# Patient Record
Sex: MALE | Race: WHITE | NOT HISPANIC OR LATINO | URBAN - METROPOLITAN AREA
[De-identification: names, ages, dates, MRNs, and addresses within clinical notes are randomized per-mention and may not be internally consistent; named-entity substitution may affect disease eponyms.]

---

## 2021-06-01 ENCOUNTER — EMERGENCY (EMERGENCY)
Facility: HOSPITAL | Age: 46
LOS: 1 days | Discharge: HOME | End: 2021-06-01
Attending: EMERGENCY MEDICINE | Admitting: EMERGENCY MEDICINE
Payer: COMMERCIAL

## 2021-06-01 VITALS
TEMPERATURE: 97 F | RESPIRATION RATE: 18 BRPM | HEART RATE: 92 BPM | DIASTOLIC BLOOD PRESSURE: 76 MMHG | OXYGEN SATURATION: 96 % | SYSTOLIC BLOOD PRESSURE: 110 MMHG

## 2021-06-01 VITALS
DIASTOLIC BLOOD PRESSURE: 85 MMHG | RESPIRATION RATE: 18 BRPM | SYSTOLIC BLOOD PRESSURE: 171 MMHG | OXYGEN SATURATION: 98 % | HEART RATE: 98 BPM

## 2021-06-01 LAB
ALBUMIN SERPL ELPH-MCNC: 4.1 G/DL — SIGNIFICANT CHANGE UP (ref 3.3–5)
ALP SERPL-CCNC: 62 U/L — SIGNIFICANT CHANGE UP (ref 40–120)
ALT FLD-CCNC: 34 U/L — SIGNIFICANT CHANGE UP (ref 4–41)
ANION GAP SERPL CALC-SCNC: 13 MMOL/L — SIGNIFICANT CHANGE UP (ref 7–14)
AST SERPL-CCNC: 24 U/L — SIGNIFICANT CHANGE UP (ref 4–40)
BASOPHILS # BLD AUTO: 0.06 K/UL — SIGNIFICANT CHANGE UP (ref 0–0.2)
BASOPHILS NFR BLD AUTO: 0.4 % — SIGNIFICANT CHANGE UP (ref 0–2)
BILIRUB SERPL-MCNC: 0.4 MG/DL — SIGNIFICANT CHANGE UP (ref 0.2–1.2)
BUN SERPL-MCNC: 18 MG/DL — SIGNIFICANT CHANGE UP (ref 7–23)
CALCIUM SERPL-MCNC: 8.7 MG/DL — SIGNIFICANT CHANGE UP (ref 8.4–10.5)
CHLORIDE SERPL-SCNC: 99 MMOL/L — SIGNIFICANT CHANGE UP (ref 98–107)
CO2 SERPL-SCNC: 24 MMOL/L — SIGNIFICANT CHANGE UP (ref 22–31)
CREAT SERPL-MCNC: 1.35 MG/DL — HIGH (ref 0.5–1.3)
EOSINOPHIL # BLD AUTO: 0.11 K/UL — SIGNIFICANT CHANGE UP (ref 0–0.5)
EOSINOPHIL NFR BLD AUTO: 0.7 % — SIGNIFICANT CHANGE UP (ref 0–6)
GLUCOSE SERPL-MCNC: 114 MG/DL — HIGH (ref 70–99)
HCT VFR BLD CALC: 36.6 % — LOW (ref 39–50)
HGB BLD-MCNC: 12.5 G/DL — LOW (ref 13–17)
IANC: 12.56 K/UL — HIGH (ref 1.5–8.5)
IMM GRANULOCYTES NFR BLD AUTO: 0.6 % — SIGNIFICANT CHANGE UP (ref 0–1.5)
LYMPHOCYTES # BLD AUTO: 1.08 K/UL — SIGNIFICANT CHANGE UP (ref 1–3.3)
LYMPHOCYTES # BLD AUTO: 7.3 % — LOW (ref 13–44)
MCHC RBC-ENTMCNC: 29.4 PG — SIGNIFICANT CHANGE UP (ref 27–34)
MCHC RBC-ENTMCNC: 34.2 GM/DL — SIGNIFICANT CHANGE UP (ref 32–36)
MCV RBC AUTO: 86.1 FL — SIGNIFICANT CHANGE UP (ref 80–100)
MONOCYTES # BLD AUTO: 0.83 K/UL — SIGNIFICANT CHANGE UP (ref 0–0.9)
MONOCYTES NFR BLD AUTO: 5.6 % — SIGNIFICANT CHANGE UP (ref 2–14)
NEUTROPHILS # BLD AUTO: 12.56 K/UL — HIGH (ref 1.8–7.4)
NEUTROPHILS NFR BLD AUTO: 85.4 % — HIGH (ref 43–77)
NRBC # BLD: 0 /100 WBCS — SIGNIFICANT CHANGE UP
NRBC # FLD: 0 K/UL — SIGNIFICANT CHANGE UP
NT-PROBNP SERPL-SCNC: 37 PG/ML — SIGNIFICANT CHANGE UP
PLATELET # BLD AUTO: 276 K/UL — SIGNIFICANT CHANGE UP (ref 150–400)
POTASSIUM SERPL-MCNC: 4 MMOL/L — SIGNIFICANT CHANGE UP (ref 3.5–5.3)
POTASSIUM SERPL-SCNC: 4 MMOL/L — SIGNIFICANT CHANGE UP (ref 3.5–5.3)
PROT SERPL-MCNC: 7.3 G/DL — SIGNIFICANT CHANGE UP (ref 6–8.3)
RBC # BLD: 4.25 M/UL — SIGNIFICANT CHANGE UP (ref 4.2–5.8)
RBC # FLD: 13.1 % — SIGNIFICANT CHANGE UP (ref 10.3–14.5)
SODIUM SERPL-SCNC: 136 MMOL/L — SIGNIFICANT CHANGE UP (ref 135–145)
TROPONIN T, HIGH SENSITIVITY RESULT: 6 NG/L — SIGNIFICANT CHANGE UP
WBC # BLD: 14.73 K/UL — HIGH (ref 3.8–10.5)
WBC # FLD AUTO: 14.73 K/UL — HIGH (ref 3.8–10.5)

## 2021-06-01 PROCEDURE — 73030 X-RAY EXAM OF SHOULDER: CPT | Mod: 26,RT

## 2021-06-01 PROCEDURE — 99284 EMERGENCY DEPT VISIT MOD MDM: CPT | Mod: 25

## 2021-06-01 PROCEDURE — 93010 ELECTROCARDIOGRAM REPORT: CPT

## 2021-06-01 PROCEDURE — 71045 X-RAY EXAM CHEST 1 VIEW: CPT | Mod: 26

## 2021-06-01 RX ORDER — ACETAMINOPHEN 500 MG
650 TABLET ORAL ONCE
Refills: 0 | Status: COMPLETED | OUTPATIENT
Start: 2021-06-01 | End: 2021-06-01

## 2021-06-01 RX ORDER — LIDOCAINE 4 G/100G
1 CREAM TOPICAL
Qty: 5 | Refills: 0
Start: 2021-06-01

## 2021-06-01 RX ORDER — CYCLOBENZAPRINE HYDROCHLORIDE 10 MG/1
1 TABLET, FILM COATED ORAL
Qty: 14 | Refills: 0
Start: 2021-06-01

## 2021-06-01 RX ORDER — IBUPROFEN 200 MG
1 TABLET ORAL
Qty: 25 | Refills: 0
Start: 2021-06-01

## 2021-06-01 RX ORDER — LIDOCAINE 4 G/100G
1 CREAM TOPICAL ONCE
Refills: 0 | Status: COMPLETED | OUTPATIENT
Start: 2021-06-01 | End: 2021-06-01

## 2021-06-01 RX ORDER — KETOROLAC TROMETHAMINE 30 MG/ML
30 SYRINGE (ML) INJECTION ONCE
Refills: 0 | Status: DISCONTINUED | OUTPATIENT
Start: 2021-06-01 | End: 2021-06-01

## 2021-06-01 RX ADMIN — LIDOCAINE 1 PATCH: 4 CREAM TOPICAL at 21:10

## 2021-06-01 RX ADMIN — Medication 30 MILLIGRAM(S): at 21:10

## 2021-06-01 RX ADMIN — Medication 650 MILLIGRAM(S): at 21:09

## 2021-06-01 NOTE — ED ADULT NURSE NOTE - OBJECTIVE STATEMENT
45 year old male c/o right shoulder/scapula pain, radiates to chest, right head/scalp pain s/p trip and fall while playing basketball with his son. Hx of NSVT, HTN. Pt. denies LOC, admits to feeling dizzy after fall. Denies use of blood thinners. Limited ROM of right UE, strong radial pulses B/L. Superficial abrasions noted to right shoulder, right side of head, no active bleeding. NSR on CM, respirations are even and unlabored. 18g IV placed by EMS is patent. Labs sent as ordered. Will continue to monitor.

## 2021-06-01 NOTE — ED PROVIDER NOTE - MUSCULOSKELETAL, MLM
Right shoulder: + tenderness to deltoid region w/ abrasion and scapular region. pain with lateral extension. freely moving elbow and wrist joint. No midline spinal tenderness.  Spine appears normal, range of motion is not limited, no muscle or joint tenderness

## 2021-06-01 NOTE — ED PROVIDER NOTE - PATIENT PORTAL LINK FT
You can access the FollowMyHealth Patient Portal offered by [facility] by registering at the following website: [email or web address]. By joining [employer]’s FollowMyHealth portal, you will also be able to view your health information using other applications (apps) compatible with our system.

## 2021-06-01 NOTE — ED PROVIDER NOTE - CLINICAL SUMMARY MEDICAL DECISION MAKING FREE TEXT BOX
patient with hx of HTN, SVT presenting with right shoulder/chest/ upper back pain s/p mechanical fall. patient well appearing, vitals stable, EKG NSR nonischemic. right shoulder tenderness noted on exam. given cardiac hx will obtain routine labs, including trop, bnp. CXR, right shoulder xray, analagesic

## 2021-06-01 NOTE — ED ADULT NURSE NOTE - INTERVENTIONS DEFINITIONS
[place] to call system/Call bell, personal items and telephone within reach/Instruct patient to call for assistance/Monitor gait and stability/Monitor for mental status changes and reorient to person, place, and time

## 2021-06-01 NOTE — ED PROVIDER NOTE - OBJECTIVE STATEMENT
patient is a 46 y/o M with hx of HTN, SVT managed on Cardizem presenting with a c/o right shoulder and chest pain s/p mechanical fall while playing basketball. he reports tripping over the base of the basketball hoop, hitting his right shoulder/chest/and upper back. immediately after the fall he felt like his heart was racing and SOB, prompting the visit to the ED. patient reports feeling better, symptoms resolved and believes symptoms made of been contributory to anxiety attack. patient is a 44 y/o M with hx of HTN, SVT managed on Cardizem presenting with a c/o right shoulder and chest pain s/p mechanical fall while playing basketball. he reports tripping over the base of the basketball hoop, hitting his right shoulder/chest/and upper back. immediately after the fall he felt like his heart was racing and SOB, prompting the visit to the ED. patient reports feeling better, symptoms resolved and believes symptoms made of been contributory to anxiety attack.    Attendinyo male presents with right shoulder pain s/p fall on the right side playing basketball.  has large hematoma over the shoulder and elbow.  no shortness of breath.

## 2021-06-01 NOTE — ED PROVIDER NOTE - PROGRESS NOTE DETAILS
PA Smartt: on reassessment patient report feeling better. labs reviewed, no gross abnormal values. xray negative for acute fracture. patient advised on symptoms. medication sent to pharmacy. ortho referral provided

## 2021-06-01 NOTE — ED PROVIDER NOTE - CARDIAC, MLM
No chest wall tenderness. Normal rate, regular rhythm.  Heart sounds S1, S2.  No murmurs, rubs or gallops.

## 2021-06-01 NOTE — ED PROVIDER NOTE - PHYSICAL EXAMINATION
Head: small linear abrasion to the right temporal region of head.  Face: no facial bony tenderness.   Neck: no midline spinal tenderness.

## 2021-06-01 NOTE — ED ADULT TRIAGE NOTE - CHIEF COMPLAINT QUOTE
pt c/o sudden onset chest pain after falling while playing basketball.  pt received asa 325mg #18g RAC.  pt appears pale

## 2021-06-02 LAB — SARS-COV-2 RNA SPEC QL NAA+PROBE: SIGNIFICANT CHANGE UP

## 2023-09-21 ENCOUNTER — INPATIENT (INPATIENT)
Facility: HOSPITAL | Age: 48
LOS: 0 days | Discharge: HOME | End: 2023-09-22
Attending: HOSPITALIST | Admitting: HOSPITALIST
Payer: COMMERCIAL

## 2023-09-21 VITALS
DIASTOLIC BLOOD PRESSURE: 87 MMHG | SYSTOLIC BLOOD PRESSURE: 135 MMHG | HEART RATE: 92 BPM | RESPIRATION RATE: 16 BRPM | OXYGEN SATURATION: 99 % | TEMPERATURE: 98 F

## 2023-09-21 DIAGNOSIS — R07.9 CHEST PAIN, UNSPECIFIED: ICD-10-CM

## 2023-09-21 PROBLEM — I47.1 SUPRAVENTRICULAR TACHYCARDIA: Chronic | Status: ACTIVE | Noted: 2021-06-01

## 2023-09-21 PROBLEM — I10 ESSENTIAL (PRIMARY) HYPERTENSION: Chronic | Status: ACTIVE | Noted: 2021-06-01

## 2023-09-21 LAB
ANION GAP SERPL CALC-SCNC: 15 MMOL/L — HIGH (ref 7–14)
APPEARANCE UR: CLEAR — SIGNIFICANT CHANGE UP
APTT BLD: 21.4 SEC — LOW (ref 24.5–35.6)
BACTERIA # UR AUTO: NEGATIVE /HPF — SIGNIFICANT CHANGE UP
BILIRUB UR-MCNC: NEGATIVE — SIGNIFICANT CHANGE UP
BUN SERPL-MCNC: 23 MG/DL — SIGNIFICANT CHANGE UP (ref 7–23)
CALCIUM SERPL-MCNC: 9 MG/DL — SIGNIFICANT CHANGE UP (ref 8.4–10.5)
CAST: 0 /LPF — SIGNIFICANT CHANGE UP (ref 0–4)
CHLORIDE SERPL-SCNC: 101 MMOL/L — SIGNIFICANT CHANGE UP (ref 98–107)
CK SERPL-CCNC: 116 U/L — SIGNIFICANT CHANGE UP (ref 30–200)
CO2 SERPL-SCNC: 18 MMOL/L — LOW (ref 22–31)
COLOR SPEC: YELLOW — SIGNIFICANT CHANGE UP
CREAT ?TM UR-MCNC: 42 MG/DL — SIGNIFICANT CHANGE UP
CREAT SERPL-MCNC: 1.34 MG/DL — HIGH (ref 0.5–1.3)
CRP SERPL-MCNC: 16.7 MG/L — HIGH
DIFF PNL FLD: ABNORMAL
EGFR: 66 ML/MIN/1.73M2 — SIGNIFICANT CHANGE UP
ERYTHROCYTE [SEDIMENTATION RATE] IN BLOOD: 38 MM/HR — HIGH (ref 1–15)
GLUCOSE SERPL-MCNC: 138 MG/DL — HIGH (ref 70–99)
GLUCOSE UR QL: NEGATIVE MG/DL — SIGNIFICANT CHANGE UP
HCT VFR BLD CALC: 35.7 % — LOW (ref 39–50)
HGB BLD-MCNC: 12.7 G/DL — LOW (ref 13–17)
INR BLD: 0.96 RATIO — SIGNIFICANT CHANGE UP (ref 0.85–1.18)
KETONES UR-MCNC: NEGATIVE MG/DL — SIGNIFICANT CHANGE UP
LEUKOCYTE ESTERASE UR-ACNC: ABNORMAL
MCHC RBC-ENTMCNC: 31.1 PG — SIGNIFICANT CHANGE UP (ref 27–34)
MCHC RBC-ENTMCNC: 35.6 GM/DL — SIGNIFICANT CHANGE UP (ref 32–36)
MCV RBC AUTO: 87.5 FL — SIGNIFICANT CHANGE UP (ref 80–100)
NITRITE UR-MCNC: NEGATIVE — SIGNIFICANT CHANGE UP
NRBC # BLD: 0 /100 WBCS — SIGNIFICANT CHANGE UP (ref 0–0)
NRBC # FLD: 0 K/UL — SIGNIFICANT CHANGE UP (ref 0–0)
PH UR: 7 — SIGNIFICANT CHANGE UP (ref 5–8)
PLATELET # BLD AUTO: 276 K/UL — SIGNIFICANT CHANGE UP (ref 150–400)
POTASSIUM SERPL-MCNC: 5.5 MMOL/L — HIGH (ref 3.5–5.3)
POTASSIUM SERPL-SCNC: 5.5 MMOL/L — HIGH (ref 3.5–5.3)
PROT ?TM UR-MCNC: 39 MG/DL — SIGNIFICANT CHANGE UP
PROT UR-MCNC: 100 MG/DL
PROT/CREAT UR-RTO: 1 RATIO — HIGH (ref 0–0.2)
PROTHROM AB SERPL-ACNC: 10.9 SEC — SIGNIFICANT CHANGE UP (ref 9.5–13)
RBC # BLD: 4.08 M/UL — LOW (ref 4.2–5.8)
RBC # FLD: 13.3 % — SIGNIFICANT CHANGE UP (ref 10.3–14.5)
RBC CASTS # UR COMP ASSIST: 8 /HPF — HIGH (ref 0–4)
REVIEW: SIGNIFICANT CHANGE UP
SODIUM SERPL-SCNC: 134 MMOL/L — LOW (ref 135–145)
SP GR SPEC: 1.01 — SIGNIFICANT CHANGE UP (ref 1–1.03)
SQUAMOUS # UR AUTO: 0 /HPF — SIGNIFICANT CHANGE UP (ref 0–5)
TROPONIN T, HIGH SENSITIVITY RESULT: 22 NG/L — SIGNIFICANT CHANGE UP
TROPONIN T, HIGH SENSITIVITY RESULT: 22 NG/L — SIGNIFICANT CHANGE UP
UROBILINOGEN FLD QL: 0.2 MG/DL — SIGNIFICANT CHANGE UP (ref 0.2–1)
WBC # BLD: 9 K/UL — SIGNIFICANT CHANGE UP (ref 3.8–10.5)
WBC # FLD AUTO: 9 K/UL — SIGNIFICANT CHANGE UP (ref 3.8–10.5)
WBC UR QL: 3 /HPF — SIGNIFICANT CHANGE UP (ref 0–5)

## 2023-09-21 PROCEDURE — 71046 X-RAY EXAM CHEST 2 VIEWS: CPT | Mod: 26

## 2023-09-21 PROCEDURE — 76770 US EXAM ABDO BACK WALL COMP: CPT | Mod: 26

## 2023-09-21 PROCEDURE — 93306 TTE W/DOPPLER COMPLETE: CPT | Mod: 26

## 2023-09-21 PROCEDURE — 99285 EMERGENCY DEPT VISIT HI MDM: CPT

## 2023-09-21 PROCEDURE — 93010 ELECTROCARDIOGRAM REPORT: CPT

## 2023-09-21 RX ORDER — ENOXAPARIN SODIUM 100 MG/ML
40 INJECTION SUBCUTANEOUS EVERY 24 HOURS
Refills: 0 | Status: DISCONTINUED | OUTPATIENT
Start: 2023-09-21 | End: 2023-09-22

## 2023-09-21 RX ORDER — LOSARTAN POTASSIUM 100 MG/1
1 TABLET, FILM COATED ORAL
Refills: 0 | DISCHARGE

## 2023-09-21 RX ORDER — COLCHICINE 0.6 MG
0.6 TABLET ORAL
Refills: 0 | Status: DISCONTINUED | OUTPATIENT
Start: 2023-09-21 | End: 2023-09-22

## 2023-09-21 RX ORDER — LOSARTAN POTASSIUM 100 MG/1
100 TABLET, FILM COATED ORAL DAILY
Refills: 0 | Status: DISCONTINUED | OUTPATIENT
Start: 2023-09-21 | End: 2023-09-22

## 2023-09-21 RX ORDER — INFLUENZA VIRUS VACCINE 15; 15; 15; 15 UG/.5ML; UG/.5ML; UG/.5ML; UG/.5ML
0.5 SUSPENSION INTRAMUSCULAR ONCE
Refills: 0 | Status: DISCONTINUED | OUTPATIENT
Start: 2023-09-21 | End: 2023-09-22

## 2023-09-21 RX ORDER — DILTIAZEM HCL 120 MG
1 CAPSULE, EXT RELEASE 24 HR ORAL
Refills: 0 | DISCHARGE

## 2023-09-21 RX ORDER — DILTIAZEM HCL 120 MG
180 CAPSULE, EXT RELEASE 24 HR ORAL DAILY
Refills: 0 | Status: DISCONTINUED | OUTPATIENT
Start: 2023-09-21 | End: 2023-09-22

## 2023-09-21 RX ORDER — SODIUM CHLORIDE 9 MG/ML
1000 INJECTION INTRAMUSCULAR; INTRAVENOUS; SUBCUTANEOUS ONCE
Refills: 0 | Status: COMPLETED | OUTPATIENT
Start: 2023-09-21 | End: 2023-09-21

## 2023-09-21 RX ADMIN — ENOXAPARIN SODIUM 40 MILLIGRAM(S): 100 INJECTION SUBCUTANEOUS at 17:07

## 2023-09-21 RX ADMIN — SODIUM CHLORIDE 1000 MILLILITER(S): 9 INJECTION INTRAMUSCULAR; INTRAVENOUS; SUBCUTANEOUS at 04:03

## 2023-09-21 RX ADMIN — Medication 0.6 MILLIGRAM(S): at 17:06

## 2023-09-21 RX ADMIN — LOSARTAN POTASSIUM 100 MILLIGRAM(S): 100 TABLET, FILM COATED ORAL at 13:03

## 2023-09-21 RX ADMIN — Medication 180 MILLIGRAM(S): at 13:04

## 2023-09-21 NOTE — PATIENT PROFILE ADULT - FALL HARM RISK - HARM RISK INTERVENTIONS
Communicate Risk of Fall with Harm to all staff/Orthostatic vital signs/Reinforce activity limits and safety measures with patient and family/Tailored Fall Risk Interventions/Visual Cue: Yellow wristband and red socks/Bed in lowest position, wheels locked, appropriate side rails in place/Call bell, personal items and telephone in reach/Instruct patient to call for assistance before getting out of bed or chair/Non-slip footwear when patient is out of bed/[place] to call system/Physically safe environment - no spills, clutter or unnecessary equipment/Purposeful Proactive Rounding/Room/bathroom lighting operational, light cord in reach

## 2023-09-21 NOTE — ED ADULT NURSE REASSESSMENT NOTE - NS ED NURSE REASSESS COMMENT FT1
pt A&Ox4 offering no complaints at this time. endorsing partial relief of c/p. remains on continuos monitor, NSR noted. respirations even and unlabored. awaiting results. safety maintained, call bell within reach

## 2023-09-21 NOTE — H&P ADULT - ASSESSMENT
47-year-old male past medical history hypertension, SVT on losartan and Cardizem presents to ED for approximately 4 to 5 hours of central chest pain/discomfort.  Patient was getting ready for bed when the symptoms started.  No exertion.  Central chest pain nonradiating.  No nausea no vomiting no diaphoresis.  Patient denies fever, chills, cough, shortness of breath, abdominal pain, nausea, vomiting.  No recent immobilization or surgeries.  Patient denies alcohol, tobacco, drug use.  No similar symptoms prior.  No recent echo or stress test.  47-year-old male past medical history hypertension, SVT on losartan and Cardizem presents to ED for approximately 4 to 5 hours of central chest pain/discomfort.  Patient was getting ready for bed when the symptoms started.  No exertion.  Central chest pain nonradiating.  No nausea no vomiting no diaphoresis.  Patient denies fever, chills, cough, shortness of breath, abdominal pain, nausea, vomiting.  No recent immobilization or surgeries.  Patient denies alcohol, tobacco, drug use.  No similar symptoms prior.  No recent echo or stress test.    1 Chest pain  - telemonitor  - ro ACS  - cards fu   - ischemia more as per cards    2 Hx of SVT  - EP fu  - cw diltiazem  - telemonitor    3 HTN  - cw losartan  - DASH diet     4 Lovenox for DVT prophylaxis

## 2023-09-21 NOTE — CHART NOTE - NSCHARTNOTEFT_GEN_A_CORE
Called by RN for patient with St elevation in his telemetry strips. Patient seen and examined, he notes mild sternal chest discomfort that is worse with deep inspiration and while laying flat. He denies nausea, vomiting, dizziness, syncope, loc, abd pain, diarrhea, recent sickness or sick contact.     Vital Signs Last 24 Hrs  T(C): 36.6 (21 Sep 2023 09:23), Max: 36.6 (21 Sep 2023 09:23)  T(F): 97.8 (21 Sep 2023 09:23), Max: 97.8 (21 Sep 2023 09:23)  HR: 85 (21 Sep 2023 09:23) (85 - 92)  BP: 130/93 (21 Sep 2023 09:23) (130/93 - 147/72)  BP(mean): --  RR: 18 (21 Sep 2023 09:23) (16 - 18)  SpO2: 100% (21 Sep 2023 09:23) (99% - 100%)      PHYSICAL EXAM:  GENERAL: NAD, well-developed  HEAD:  Atraumatic, Normocephalic  EYES: EOMI, PERRLA, conjunctiva and sclera clear  NECK: Supple, No JVD  CHEST/LUNG: Clear to auscultation bilaterally; No wheeze/rhonchi/rale  HEART: Regular rate and rhythm; No murmurs, rubs, or gallops  ABDOMEN: Soft, Nontender, Nondistended; Bowel sounds present  EXTREMITIES:  2+ Peripheral Pulses, No clubbing, cyanosis, or edema  PSYCH: AAOx3  NEUROLOGY: non-focal  SKIN: No rashes or lesions     Initial EKG NSR without significant changes  Repeat EKG NSR with diffuse 1 to 2 mm ST elevation, AVr Depression    Troponin 21 > 22    47-year-old male past medical history hypertension, SVT on losartan and Cardizem presents to ED for chest pain.    1. EKG Change - Patient reports mild discomfort with new diffuse ST elevations on EKG.  - Will repeat Troponin/CK and check ESR/CRP  - Given patient well appearing and vitally stable suspect Pericarditis  - Will check echocardiogram to r/o effusion/tamponade though clinically not consistent with tamponade.   - Discussed with Dr. Don; agree with the above plan.

## 2023-09-21 NOTE — ED PROVIDER NOTE - NS ED ROS FT
Constitutional: No fever. no chills.   Eyes: No visual changes, eye pain or redness  HEENT: No throat pain, hearing changes, ear pain, nasal pain. No nose bleeding.  CV: + chest pain, no palpitations  Resp: No shortness of breath, no cough  GI: No abdominal pain. No nausea. no  vomiting. No diarrhea. No constipation.   : No dysuria, hematuria. no urinary frequency, no urinary urgency.  MSK: No musculoskeletal pain  Skin: No rash, lesions, no bruises  Neuro: No headache. No numbness or tingling. No weakness. No dizziness.  Allergy/Immunology: no allergy to medicine

## 2023-09-21 NOTE — H&P ADULT - NSHPLABSRESULTS_GEN_ALL_CORE
Lab Results:  CBC  CBC Full  -  ( 21 Sep 2023 05:04 )  WBC Count : 12.37 K/uL  RBC Count : 3.87 M/uL  Hemoglobin : 11.6 g/dL  Hematocrit : 33.4 %  Platelet Count - Automated : 232 K/uL  Mean Cell Volume : 86.3 fL  Mean Cell Hemoglobin : 30.0 pg  Mean Cell Hemoglobin Concentration : 34.7 gm/dL  Auto Neutrophil # : 10.22 K/uL  Auto Lymphocyte # : 1.02 K/uL  Auto Monocyte # : 0.82 K/uL  Auto Eosinophil # : 0.18 K/uL  Auto Basophil # : 0.07 K/uL  Auto Neutrophil % : 82.6 %  Auto Lymphocyte % : 8.2 %  Auto Monocyte % : 6.6 %  Auto Eosinophil % : 1.5 %  Auto Basophil % : 0.6 %    .		Differential:	[] Automated		[] Manual  Chemistry                        11.6   12.37 )-----------( 232      ( 21 Sep 2023 05:04 )             33.4     09-21    135  |  100  |  27<H>  ----------------------------<  132<H>  4.3   |  20<L>  |  1.43<H>    Ca    8.8      21 Sep 2023 05:04    TPro  6.5  /  Alb  3.4  /  TBili  0.5  /  DBili  x   /  AST  20  /  ALT  33  /  AlkPhos  50  09-21    LIVER FUNCTIONS - ( 21 Sep 2023 05:04 )  Alb: 3.4 g/dL / Pro: 6.5 g/dL / ALK PHOS: 50 U/L / ALT: 33 U/L / AST: 20 U/L / GGT: x           PT/INR - ( 21 Sep 2023 06:08 )   PT: 10.9 sec;   INR: 0.96 ratio         PTT - ( 21 Sep 2023 06:08 )  PTT:21.4 sec  Urinalysis Basic - ( 21 Sep 2023 05:04 )    Color: x / Appearance: x / SG: x / pH: x  Gluc: 132 mg/dL / Ketone: x  / Bili: x / Urobili: x   Blood: x / Protein: x / Nitrite: x   Leuk Esterase: x / RBC: x / WBC x   Sq Epi: x / Non Sq Epi: x / Bacteria: x            MICROBIOLOGY/CULTURES:      RADIOLOGY RESULTS: reviewed

## 2023-09-21 NOTE — CONSULT NOTE ADULT - SUBJECTIVE AND OBJECTIVE BOX
[facility] NEPHROLOGY- CONSULTATION NOTE    47 year old Male with history of below presents with chest pain. Nephrology consulted for elevated Scr.    Patient with h/o CKD-2 with microscopic hematuria and kidney stones for which he follows with an outpatient Urologist. Patient on losartan for h/o HTN. Patient denies any h/o nephrolithiasis, hepatitis, HIV, IVDA, tattoos, prior PRBC transfusions, herbal medications or chronic NSAID use.    Patient diagnosed with pericarditis plan to start colchicine 0.6 mg twice daily.    REVIEW OF SYSTEMS:  Gen: no fevers  HEENT: no rhinorrhea  Neck: no sore throat  Cards: + chest pain  Resp: + dyspnea  GI: no nausea or vomiting or diarrhea  : no dysuria or hematuria  Vascular: + LE edema  Derm: no rashes  Neuro: no numbness/tingling    No Known Allergies      Home Medications Reviewed  Hospital Medications:   MEDICATIONS  (STANDING):  colchicine 0.6 milliGRAM(s) Oral two times a day  diltiazem    milliGRAM(s) Oral daily  enoxaparin Injectable 40 milliGRAM(s) SubCutaneous every 24 hours  losartan 100 milliGRAM(s) Oral daily      PAST MEDICAL & SURGICAL HISTORY:  HTN (hypertension)      SVT (supraventricular tachycardia)      No significant past surgical history          FAMILY HISTORY:  N/C    SOCIAL HISTORY:  Denies toxic substance use     VITALS:  T(F): 97.8 (09-21-23 @ 09:23), Max: 97.8 (09-21-23 @ 09:23)  HR: 84 (09-21-23 @ 13:09)  BP: 126/74 (09-21-23 @ 13:09)  RR: 18 (09-21-23 @ 13:09)  SpO2: 100% (09-21-23 @ 13:09)  Wt(kg): --    Height (cm): 172.7 (09-21 @ 09:23)  Weight (kg): 87.7 (09-21 @ 09:23)  BMI (kg/m2): 29.4 (09-21 @ 09:23)  BSA (m2): 2.01 (09-21 @ 09:23)    PHYSICAL EXAM:  Gen: NAD, calm  HEENT: MMM  Neck: no JVD  Cards: RRR, +S1/S2, no M/G/R  Resp: CTA B/L  GI: soft, NT/ND, NABS  : no CVA tenderness  Vascular: no LE edema B/L  Derm: no rashes  Neuro: non-focal    LABS:  09-21    134<L>  |  101  |  23  ----------------------------<  138<H>  5.5<H>   |  18<L>  |  1.34<H>    Ca    9.0      21 Sep 2023 10:13    TPro  6.5  /  Alb  3.4  /  TBili  0.5  /  DBili      /  AST  20  /  ALT  33  /  AlkPhos  50  09-21    Creatinine Trend: 1.34 <--, 1.43 <--                        12.7   9.00  )-----------( 276      ( 21 Sep 2023 11:35 )             35.7     Urine Studies:  Urinalysis Basic - ( 21 Sep 2023 10:13 )    Color:  / Appearance:  / SG:  / pH:   Gluc: 138 mg/dL / Ketone:   / Bili:  / Urobili:    Blood:  / Protein:  / Nitrite:    Leuk Esterase:  / RBC:  / WBC    Sq Epi:  / Non Sq Epi:  / Bacteria:           RADIOLOGY & ADDITIONAL STUDIES:    < from: Xray Chest 2 Views PA/Lat (09.21.23 @ 05:33) >    IMPRESSION:    Clear lungs.    --- End of Report ---    < end of copied text >

## 2023-09-21 NOTE — ED PROVIDER NOTE - PHYSICAL EXAMINATION
GEN: no acute respiratory distress. nontoxic, speaking comfortably in full sentences  HEENT: NCAT. face symmetrical. PERRL 4mm, EOMI, normal auditory canal b/l, normal TM b/l. MMM, oropharynx wnl.  Neck: no JVD, trachea midline, no lymphadenopathy, FROM  CV: RRR. +S1S2, no murmur. 2+ pulses in 4 extremities, cap refill <2 sec  Chest: CTA B/l. no wheezing, rales, rhonchi. no retractions. good air movement.   ABD: +BS, soft, non distended, non tender. No guarding/rebound. No lesions, ecchymosis, surgical scar  : no cva or suprapubic tenderness  MSK: No clubbing, cyanosis, edema. FROM of all extremities. no tenderness to palpation. No midline or paraspinal tenderness.   Neuro: AAOX3.  Sensation intact, motor 5/5 throughout.   SKIN: No erythema, lesions or rash

## 2023-09-21 NOTE — PATIENT PROFILE ADULT - NSPROIMPLANTSMEDDEV_GEN_A_NUR
Updated patient that per VEENA Dubois patient may have temporary handicap renewed. Once patient returns to [facility] patient can discuss permanent handicap permit with PCP. Patient verbalized understanding, and will  temporary handicap form at [facility] ortho reception desk.    None

## 2023-09-21 NOTE — CONSULT NOTE ADULT - SUBJECTIVE AND OBJECTIVE BOX
HISTORY OF PRESENT ILLNESS: HPI:   47-year-old male past medical history hypertension, SVT on losartan and Cardizem presents to ED for approximately 4 to 5 hours of central chest pain/discomfort.  Patient was getting ready for bed when the symptoms started.  No exertion.  Central chest pain nonradiating.  No nausea no vomiting no diaphoresis.  Patient denies fever, chills, cough, shortness of breath, abdominal pain, nausea, vomiting.  No recent immobilization or surgeries.  Patient denies alcohol, tobacco, drug use.  No similar symptoms prior.  No recent echo or stress test. (21 Sep 2023 08:49)      PAST MEDICAL & SURGICAL HISTORY:  HTN (hypertension)      SVT (supraventricular tachycardia)      No significant past surgical history            MEDICATIONS  (STANDING):      Allergies    No Known Allergies    Intolerances        FAMILY HISTORY:    Non-contributary for premature coronary disease or sudden cardiac death    SOCIAL HISTORY:    [ ] Non-smoker  [ ] Smoker  [ ] Alcohol      REVIEW OF SYSTEMS:  [ ]chest pain  [  ]shortness of breath  [  ]palpitations  [  ]syncope  [ ]near syncope [ ]upper extremity weakness   [ ] lower extremity weakness  [  ]diplopia  [  ]altered mental status   [  ]fevers  [ ]chills [ ]nausea  [ ]vomitting  [  ]dysphagia    [ ]abdominal pain  [ ]melena  [ ]BRBPR    [  ]epistaxis  [  ]rash    [ ]lower extremity edema        [ ] All others negative	  [ ] Unable to obtain    PHYSICAL EXAM:  T(C): 36.5 (09-21-23 @ 07:08), Max: 36.5 (09-21-23 @ 07:08)  HR: 88 (09-21-23 @ 07:08) (88 - 92)  BP: 147/72 (09-21-23 @ 07:08) (135/87 - 147/72)  RR: 16 (09-21-23 @ 07:08) (16 - 16)  SpO2: 99% (09-21-23 @ 07:08) (99% - 99%)  Wt(kg): --    Appearance: Normal	  HEENT:   Normal oral mucosa, PERRL, EOMI	  Lymphatic: No lymphadenopathy , no edema  Cardiovascular: Normal S1 S2, No JVD, No murmurs , Peripheral pulses palpable 2+ bilaterally  Respiratory: Lungs clear to auscultation, normal effort 	  Gastrointestinal:  Soft, Non-tender, + BS	  Skin: No rashes, No ecchymoses, No cyanosis, warm to touch  Musculoskeletal: Normal range of motion, normal strength  Psychiatry:  Mood & affect appropriate      TELEMETRY: 	    ECG:  	    Echo:  NST:  Cath:  	  	  LABS:	 	                          11.6   12.37 )-----------( 232      ( 21 Sep 2023 05:04 )             33.4     09-21    135  |  100  |  27<H>  ----------------------------<  132<H>  4.3   |  20<L>  |  1.43<H>    Ca    8.8      21 Sep 2023 05:04    TPro  6.5  /  Alb  3.4  /  TBili  0.5  /  DBili  x   /  AST  20  /  ALT  33  /  AlkPhos  50  09-21    proBNP:   Lipid Profile:   HgA1c:   TSH:     ASSESSMENT/PLAN: 	Poly Oliver M.D.  Cardiac Electrophysiology    office [phone]  pager [phone] EP Attending  HISTORY OF PRESENT ILLNESS: HPI:   47-year-old male past medical history hypertension, SVT on losartan and Cardizem presents to ED for approximately 4 to 5 hours of central chest pain/discomfort.  Patient was getting ready for bed when the symptoms started.  No exertion.  Central chest pain nonradiating.  No nausea no vomiting no diaphoresis.  Patient denies fever, chills, cough, shortness of breath, abdominal pain, nausea, vomiting.  No recent immobilization or surgeries.  Patient denies alcohol, tobacco, drug use.  No similar symptoms prior.  No recent echo or stress test. (21 Sep 2023 08:49)    Had last seen cardiology ~4-5 yrs ago.  Has Holter-monitor documented SVT that was occurring many times per week. Has done well on Diltiazem. Has not required cardioversion or ablation.    Today, here w/ acute onset chest pain overnight that has prevented sleep. Described as pleuritic and pressure, worse with lying flat and worse with deep inspiration ,but also provocable with exertion.  No cough, no viral prodrome, no fainting, no palpitations. A 10 pt ROS is otherwise negative.    PAST MEDICAL & SURGICAL HISTORY:  HTN (hypertension)  SVT (supraventricular tachycardia)  No significant past surgical history      MEDICATIONS  (STANDING):  diltiazem    Allergies    No Known Allergies    Intolerances        FAMILY HISTORY:    Non-contributary for premature coronary disease or sudden cardiac death    SOCIAL HISTORY:    [x ] Non-smoker  [ ] Smoker  [ ] Alcohol    PHYSICAL EXAM:  T(C): 36.5 (09-21-23 @ 07:08), Max: 36.5 (09-21-23 @ 07:08)  HR: 88 (09-21-23 @ 07:08) (88 - 92)  BP: 147/72 (09-21-23 @ 07:08) (135/87 - 147/72)  RR: 16 (09-21-23 @ 07:08) (16 - 16)  SpO2: 99% (09-21-23 @ 07:08) (99% - 99%)  Wt(kg): --    General: Well nourished, no acute distress, alert and oriented x 3  Head: normocephalic, no trauma, prior excisional biopsies for basal cell on scalp, cheeks.  Neck: no JVD, no bruit, supple, not enlarged  CV: S1S2, no S3, regular rate, rhythm is SINUS, no murmur, audible pericardial friction rub.  Lungs: clear BL, no rales or wheezes  Abdomen: bowel sounds +, soft, nontender, nondistended  Extremities: no clubbing, cyanosis or edema  Neuro: Moves all 4 extremities, sensation intact x 4 extremities  Skin: warm and moist, normal turgor  Psych: Mood and affect are appropriate for circumstances  MSK: normal range of motion and strength x4 extremities.      TELEMETRY: NSR with concave ST-elevation	    ECG: initially normal, has developed diffuse concave KATELIN / J-point elevation.  Echo: pending  	  LABS:	 	                          11.6   12.37 )-----------( 232      ( 21 Sep 2023 05:04 )             33.4     09-21    135  |  100  |  27<H>  ----------------------------<  132<H>  4.3   |  20<L>  |  1.43<H>    Ca    8.8      21 Sep 2023 05:04    TPro  6.5  /  Alb  3.4  /  TBili  0.5  /  DBili  x   /  AST  20  /  ALT  33  /  AlkPhos  50  09-21    Biomarkers: Troponin 22 (indeterminate) but flat trend x 3 checks, in the setting of CKD.  Normal CK / MB.  Normal BNP.  Mildly elevated ESR/CRP.    ASSESSMENT/PLAN: Mr Jamison is a very pleasant 47y Male here with acute onset chest pain, described as positional, pleuritic, but also worse w/ exertion.  He has CKD and perhaps mild hypertension. He takes diltiazem for HTN and SVT-suppression.  He has not had any SVT breakthru on diltiazem over the last 4-5 years, and this is considered a remote history/inactive issue.  Pain and ECG changes most consistent with pericarditis.  No apparent 'cyndi-pericarditis' and no ventricular arrhythmias on telemetry.  Awaiting echocardiogram.  Cautious use of NSAIDs in the setting of CKD, with oral hydration encouraged.  May be OK for a couple of weeks. Recommend Colchicine.  Should resume care w/ Cardiology as outpatient, for routine Holter surveillance.    May need clearance to return for further Basal Cell excision from his face.  Recommend Nephrology followup as outpatient re: CKD.  Creat has been stable ~1.3 dating back to 2021 in [facility] EHR records.    Ryan Oliver M.D.  Cardiac Electrophysiology    office [phone]  pager [phone]

## 2023-09-21 NOTE — CONSULT NOTE ADULT - ASSESSMENT
47 year old Male with history of HTN presents with chest pain. Nephrology consulted for elevated Scr.    1) CKD-2: Scr stable as compared to labs in 2021. Check UA and spot urine TP/CR. Check renal US given h/o kidney stones. Avoid nephrotoxins. Monitor electrolytes.    2) HTN with CKD: BP controlled. Continue with current medications. Monitor BP.    3) Hyperkalemia: Due to hemolyzed specimen. Repeat BMP in AM. Monitor serum potassium.    4) Chest pain: Due to pericarditis. On colchicine 0.6 mg twice daily. If no improvement, can consider short course of NSAIDS but would need to discontinue losartan. F/U TTE.      [facility] NEPHROLOGY  Nick Tao M.D.  Duncan Mnacuso D.O.  Radha Cabral M.D.  MD Penny Lopes, MSN, ANP-C    Telephone: [phone]  Facsimile: [phone]    [facility] [place] [address], [address]  [place] [address]

## 2023-09-21 NOTE — H&P ADULT - HISTORY OF PRESENT ILLNESS
47-year-old male past medical history hypertension, SVT on losartan and Cardizem presents to ED for approximately 4 to 5 hours of central chest pain/discomfort.  Patient was getting ready for bed when the symptoms started.  No exertion.  Central chest pain nonradiating.  No nausea no vomiting no diaphoresis.  Patient denies fever, chills, cough, shortness of breath, abdominal pain, nausea, vomiting.  No recent immobilization or surgeries.  Patient denies alcohol, tobacco, drug use.  No similar symptoms prior.  No recent echo or stress test.

## 2023-09-21 NOTE — H&P ADULT - NSHPPHYSICALEXAM_GEN_ALL_CORE
General: WN/WD NAD  PERRLA  Neurology: A&Ox3, nonfocal, BROCK x 4  Respiratory: CTA B/L  CV: RRR, S1S2, no murmurs, rubs or gallops  Abdominal: Soft, NT, ND +BS, Last BM  Extremities: No edema, + peripheral pulses  Skin Normal

## 2023-09-21 NOTE — ED PROVIDER NOTE - CLINICAL SUMMARY MEDICAL DECISION MAKING FREE TEXT BOX
47-year-old male past medical hypertension, SVT with central chest pain for 4 to 5 hours.  No radiating or other associated symptoms.  Differential diagnosis includes, but not limited to, ACS, cardiac, GI related.  Plan: Labs, chest x-ray, aspirin 324, Pepcid, Maalox, reassess.

## 2023-09-21 NOTE — CONSULT NOTE ADULT - SUBJECTIVE AND OBJECTIVE BOX
C A R D I O L O G Y  *********************    DATE OF SERVICE: 09-21-23    HISTORY OF PRESENT ILLNESS: HPI:   Pt is a 47-year-old male past medical history of  hypertension, SVT  presents to ED for approximately 4 to 5 hours of central chest pain/discomfort.     Patient was getting ready for bed when the symptoms started.  Pain is central and non radiating, worse with position changes and has pleuritic component. NO LE edema, no palpitations no dizziness lightheadedness or orthopnea. No recent fevers or sick contacts.  Patient denies alcohol, tobacco, drug use.  No similar symptoms prior.  No recent echo or stress test.     PAST MEDICAL & SURGICAL HISTORY:  HTN (hypertension)  SVT (supraventricular tachycardia)  No significant past surgical history      MEDICATIONS:  MEDICATIONS  (STANDING):  diltiazem    milliGRAM(s) Oral daily  enoxaparin Injectable 40 milliGRAM(s) SubCutaneous every 24 hours  losartan 100 milliGRAM(s) Oral daily      Allergies: No Known Allergies      FAMILY HISTORY:    Non-contributary for premature coronary disease or sudden cardiac death    SOCIAL HISTORY:    [ X] Non-smoker  [ ] Smoker  [ ] Alcohol    FLU VACCINE THIS YEAR STARTS IN AUGUST:  [ ] Yes    [ ] No    IF OVER 65 HAVE YOU EVER HAD A PNA VACCINE:  [ ] Yes    [ ] No       [ ] N/A      REVIEW OF SYSTEMS:  [ ]chest pain  [  ]shortness of breath  [  ]palpitations  [  ]syncope  [ ]near syncope [ ]upper extremity weakness   [ ] lower extremity weakness  [  ]diplopia  [  ]altered mental status   [  ]fevers  [ ]chills [ ]nausea  [ ]vomiting  [  ]dysphagia    [ ]abdominal pain  [ ]melena  [ ]BRBPR    [  ]epistaxis  [  ]rash    [ ]lower extremity edema    [X] All others negative	  [ ] Unable to obtain      LABS:	 	    CARDIAC MARKERS:  CARDIAC MARKERS ( 21 Sep 2023 11:35 )  x     / x     / 116 U/L / x     / x                       12.7   9.00  )-----------( 276      ( 21 Sep 2023 11:35 )             35.7     Hb Trend: 12.7<--, 11.6<--    09-21    134<L>  |  101  |  23  ----------------------------<  138<H>  5.5<H>   |  18<L>  |  1.34<H>    Ca    9.0      21 Sep 2023 10:13    TPro  6.5  /  Alb  3.4  /  TBili  0.5  /  DBili  x   /  AST  20  /  ALT  33  /  AlkPhos  50  09-21    Creatinine Trend: 1.34<--, 1.43<--    Coags:  PT/INR - ( 21 Sep 2023 06:08 )   PT: 10.9 sec;   INR: 0.96 ratio         PTT - ( 21 Sep 2023 06:08 )  PTT:21.4 sec      PHYSICAL EXAM:  T(C): 36.6 (09-21-23 @ 09:23), Max: 36.6 (09-21-23 @ 09:23)  HR: 84 (09-21-23 @ 13:09) (84 - 92)  BP: 126/74 (09-21-23 @ 13:09) (126/74 - 147/72)  RR: 18 (09-21-23 @ 13:09) (16 - 18)  SpO2: 100% (09-21-23 @ 13:09) (99% - 100%)  Wt(kg): --   BMI (kg/m2): 29.4 (09-21-23 @ 09:23)  I&O's Summary      General:  Alert and Oriented * 3.   Head: Normocephalic and atraumatic.   Neck: No JVD. No bruits. Supple. Does not appear to be enlarged.   Cardiovascular: + S1,S2 ; RRR Soft systolic murmur at the left lower sternal border. No rubs noted.    Lungs: CTA b/l. No rhonchi, rales or wheezes.   Abdomen: + BS, soft. Non tender. Non distended. No rebound. No guarding.   Extremities: No clubbing/cyanosis/edema.   Neurologic: Moves all four extremities. Full range of motion.   Skin: Warm and moist. The patient's skin has normal elasticity and good skin turgor.   Psychiatric: Appropriate mood and affect.  Musculoskeletal: Normal range of motion, normal strength           TELEMETRY: NSR	      ECG:  	NSR with diffuse elevations    RADIOLOGY:         CXR:     < from: Xray Chest 2 Views PA/Lat (09.21.23 @ 05:33) >    Cardiac/mediastinum/hilum: Heart size is within normal limits.    Lung parenchyma/Pleura: The lungs are clear. There is no pleural   effusion. There is no pneumothorax.    Skeleton/soft tissues: No acute osseous abnormalities.    < end of copied text >      ASSESSMENT/PLAN:  Pt is a 47-year-old male past medical history of  hypertension, SVT  presents to ED for approximately 4 to 5 hours of central chest pain/discomfort.     1. Chest Pain  - suspect pericarditis as has psotional component as well as diffuse elevations and elevated ESR/CRP  - check TTE  - start colchicine and will discuss with nephro about NSAIDS in setting of chronic CKD    Flavio Don MD  Pager: [phone]      INTERVENTIONAL CARDIOLOGY  *****************************************    DATE OF SERVICE: 09-21-23    HISTORY OF PRESENT ILLNESS: HPI:   Pt is a 47-year-old male past medical history of  hypertension, SVT  presents to ED for approximately 4 to 5 hours of central chest pain/discomfort.     Patient was getting ready for bed when the symptoms started.  Pain is central and non radiating, worse with position changes and has pleuritic component. NO LE edema, no palpitations no dizziness lightheadedness or orthopnea. No recent fevers or sick contacts.  Patient denies alcohol, tobacco, drug use.  No similar symptoms prior.  No recent echo or stress test.     PAST MEDICAL & SURGICAL HISTORY:  HTN (hypertension)  SVT (supraventricular tachycardia)  No significant past surgical history      MEDICATIONS:  MEDICATIONS  (STANDING):  diltiazem    milliGRAM(s) Oral daily  enoxaparin Injectable 40 milliGRAM(s) SubCutaneous every 24 hours  losartan 100 milliGRAM(s) Oral daily      Allergies: No Known Allergies      FAMILY HISTORY:    Non-contributary for premature coronary disease or sudden cardiac death    SOCIAL HISTORY:    [ X] Non-smoker  [ ] Smoker  [ ] Alcohol    FLU VACCINE THIS YEAR STARTS IN AUGUST:  [ ] Yes    [ ] No    IF OVER 65 HAVE YOU EVER HAD A PNA VACCINE:  [ ] Yes    [ ] No       [ ] N/A      REVIEW OF SYSTEMS:  [ ]chest pain  [  ]shortness of breath  [  ]palpitations  [  ]syncope  [ ]near syncope [ ]upper extremity weakness   [ ] lower extremity weakness  [  ]diplopia  [  ]altered mental status   [  ]fevers  [ ]chills [ ]nausea  [ ]vomiting  [  ]dysphagia    [ ]abdominal pain  [ ]melena  [ ]BRBPR    [  ]epistaxis  [  ]rash    [ ]lower extremity edema    [X] All others negative	  [ ] Unable to obtain      LABS:	 	    CARDIAC MARKERS:  CARDIAC MARKERS ( 21 Sep 2023 11:35 )  x     / x     / 116 U/L / x     / x                       12.7   9.00  )-----------( 276      ( 21 Sep 2023 11:35 )             35.7     Hb Trend: 12.7<--, 11.6<--    09-21    134<L>  |  101  |  23  ----------------------------<  138<H>  5.5<H>   |  18<L>  |  1.34<H>    Ca    9.0      21 Sep 2023 10:13    TPro  6.5  /  Alb  3.4  /  TBili  0.5  /  DBili  x   /  AST  20  /  ALT  33  /  AlkPhos  50  09-21    Creatinine Trend: 1.34<--, 1.43<--    Coags:  PT/INR - ( 21 Sep 2023 06:08 )   PT: 10.9 sec;   INR: 0.96 ratio         PTT - ( 21 Sep 2023 06:08 )  PTT:21.4 sec      PHYSICAL EXAM:  T(C): 36.6 (09-21-23 @ 09:23), Max: 36.6 (09-21-23 @ 09:23)  HR: 84 (09-21-23 @ 13:09) (84 - 92)  BP: 126/74 (09-21-23 @ 13:09) (126/74 - 147/72)  RR: 18 (09-21-23 @ 13:09) (16 - 18)  SpO2: 100% (09-21-23 @ 13:09) (99% - 100%)  Wt(kg): --   BMI (kg/m2): 29.4 (09-21-23 @ 09:23)  I&O's Summary      General:  Alert and Oriented * 3.   Head: Normocephalic and atraumatic.   Neck: No JVD. No bruits. Supple. Does not appear to be enlarged.   Cardiovascular: + S1,S2 ; RRR Soft systolic murmur at the left lower sternal border. No rubs noted.    Lungs: CTA b/l. No rhonchi, rales or wheezes.   Abdomen: + BS, soft. Non tender. Non distended. No rebound. No guarding.   Extremities: No clubbing/cyanosis/edema.   Neurologic: Moves all four extremities. Full range of motion.   Skin: Warm and moist. The patient's skin has normal elasticity and good skin turgor.   Psychiatric: Appropriate mood and affect.  Musculoskeletal: Normal range of motion, normal strength           TELEMETRY: NSR	      ECG:  	NSR with diffuse elevations    RADIOLOGY:         CXR:     < from: Xray Chest 2 Views PA/Lat (09.21.23 @ 05:33) >    Cardiac/mediastinum/hilum: Heart size is within normal limits.    Lung parenchyma/Pleura: The lungs are clear. There is no pleural   effusion. There is no pneumothorax.    Skeleton/soft tissues: No acute osseous abnormalities.    < end of copied text >      ASSESSMENT/PLAN:  Pt is a 47-year-old male past medical history of  hypertension, SVT  presents to ED for approximately 4 to 5 hours of central chest pain/discomfort.     1. Chest Pain  - no need for emergent angiogram  - suspect pericarditis as has positional component as well as diffuse elevations and elevated ESR/CRP  - check TTE for effusion  - start colchicine  0.6 mg BID and will discuss with nephro about NSAIDS in setting of chronic CKD    Flavio Don MD  Pager: [phone]

## 2023-09-21 NOTE — ED PROVIDER NOTE - OBJECTIVE STATEMENT
patient
47-year-old male past medical history hypertension, SVT on losartan and Cardizem presents to ED for approximately 4 to 5 hours of central chest pain/discomfort.  Patient was getting ready for bed when the symptoms started.  No exertion.  Central chest pain nonradiating.  No nausea no vomiting no diaphoresis.  Patient denies fever, chills, cough, shortness of breath, abdominal pain, nausea, vomiting.  No recent immobilization or surgeries.  Patient denies alcohol, tobacco, drug use.  No similar symptoms prior.  No recent echo or stress test.

## 2023-09-21 NOTE — ED ADULT NURSE REASSESSMENT NOTE - NS ED NURSE REASSESS COMMENT FT1
pt seen @330- DOWNTIME BACK TIME NOTE- 20G placed to R forearm, labs drawn and sent. medicated as per MD orders. SEE PAPER CHARTED MAR D/T DOWNTIME. pt given 324 chewable aspirin, IV Pepcid and Maalox as per MD Valiente orders. pt tolerated well. respirations even and unlabored. awaiting results.

## 2023-09-22 ENCOUNTER — TRANSCRIPTION ENCOUNTER (OUTPATIENT)
Age: 48
End: 2023-09-22

## 2023-09-22 VITALS
HEART RATE: 76 BPM | TEMPERATURE: 98 F | OXYGEN SATURATION: 100 % | RESPIRATION RATE: 18 BRPM | SYSTOLIC BLOOD PRESSURE: 147 MMHG | DIASTOLIC BLOOD PRESSURE: 87 MMHG

## 2023-09-22 LAB
ANION GAP SERPL CALC-SCNC: 8 MMOL/L — SIGNIFICANT CHANGE UP (ref 7–14)
BUN SERPL-MCNC: 23 MG/DL — SIGNIFICANT CHANGE UP (ref 7–23)
CALCIUM SERPL-MCNC: 8.9 MG/DL — SIGNIFICANT CHANGE UP (ref 8.4–10.5)
CHLORIDE SERPL-SCNC: 105 MMOL/L — SIGNIFICANT CHANGE UP (ref 98–107)
CO2 SERPL-SCNC: 26 MMOL/L — SIGNIFICANT CHANGE UP (ref 22–31)
CREAT SERPL-MCNC: 1.43 MG/DL — HIGH (ref 0.5–1.3)
EGFR: 61 ML/MIN/1.73M2 — SIGNIFICANT CHANGE UP
GLUCOSE SERPL-MCNC: 111 MG/DL — HIGH (ref 70–99)
HCT VFR BLD CALC: 33.6 % — LOW (ref 39–50)
HGB BLD-MCNC: 11.8 G/DL — LOW (ref 13–17)
MAGNESIUM SERPL-MCNC: 2.1 MG/DL — SIGNIFICANT CHANGE UP (ref 1.6–2.6)
MCHC RBC-ENTMCNC: 30.1 PG — SIGNIFICANT CHANGE UP (ref 27–34)
MCHC RBC-ENTMCNC: 35.1 GM/DL — SIGNIFICANT CHANGE UP (ref 32–36)
MCV RBC AUTO: 85.7 FL — SIGNIFICANT CHANGE UP (ref 80–100)
NRBC # BLD: 0 /100 WBCS — SIGNIFICANT CHANGE UP (ref 0–0)
NRBC # FLD: 0 K/UL — SIGNIFICANT CHANGE UP (ref 0–0)
PHOSPHATE SERPL-MCNC: 3.1 MG/DL — SIGNIFICANT CHANGE UP (ref 2.5–4.5)
PLATELET # BLD AUTO: 248 K/UL — SIGNIFICANT CHANGE UP (ref 150–400)
POTASSIUM SERPL-MCNC: 4.4 MMOL/L — SIGNIFICANT CHANGE UP (ref 3.5–5.3)
POTASSIUM SERPL-SCNC: 4.4 MMOL/L — SIGNIFICANT CHANGE UP (ref 3.5–5.3)
RBC # BLD: 3.92 M/UL — LOW (ref 4.2–5.8)
RBC # FLD: 13.6 % — SIGNIFICANT CHANGE UP (ref 10.3–14.5)
SODIUM SERPL-SCNC: 139 MMOL/L — SIGNIFICANT CHANGE UP (ref 135–145)
WBC # BLD: 7.2 K/UL — SIGNIFICANT CHANGE UP (ref 3.8–10.5)
WBC # FLD AUTO: 7.2 K/UL — SIGNIFICANT CHANGE UP (ref 3.8–10.5)

## 2023-09-22 RX ORDER — COLCHICINE 0.6 MG
1 TABLET ORAL
Qty: 60 | Refills: 0
Start: 2023-09-22 | End: 2023-10-21

## 2023-09-22 RX ADMIN — LOSARTAN POTASSIUM 100 MILLIGRAM(S): 100 TABLET, FILM COATED ORAL at 06:22

## 2023-09-22 RX ADMIN — Medication 180 MILLIGRAM(S): at 06:21

## 2023-09-22 RX ADMIN — Medication 0.6 MILLIGRAM(S): at 06:21

## 2023-09-22 NOTE — PROGRESS NOTE ADULT - SUBJECTIVE AND OBJECTIVE BOX
DATE OF SERVICE: 09-22-23    Patient denies chest pain or shortness of breath.   Review of symptoms otherwise negative.    MEDICATIONS:  colchicine 0.6 milliGRAM(s) Oral two times a day  diltiazem    milliGRAM(s) Oral daily  enoxaparin Injectable 40 milliGRAM(s) SubCutaneous every 24 hours  influenza   Vaccine 0.5 milliLiter(s) IntraMuscular once  losartan 100 milliGRAM(s) Oral daily      LABS:                        11.8   7.20  )-----------( 248      ( 22 Sep 2023 05:47 )             33.6       Hemoglobin: 11.8 g/dL (09-22 @ 05:47)  Hemoglobin: 12.7 g/dL (09-21 @ 11:35)  Hemoglobin: 11.6 g/dL (09-21 @ 05:04)      09-22    139  |  105  |  23  ----------------------------<  111<H>  4.4   |  26  |  1.43<H>    Ca    8.9      22 Sep 2023 05:47  Phos  3.1     09-22  Mg     2.10     09-22    TPro  6.5  /  Alb  3.4  /  TBili  0.5  /  DBili  x   /  AST  20  /  ALT  33  /  AlkPhos  50  09-21    Creatinine Trend: 1.43<--, 1.34<--, 1.43<--    COAGS:     CARDIAC MARKERS ( 21 Sep 2023 11:35 )  x     / x     / 116 U/L / x     / x            PHYSICAL EXAM:  T(C): 36.8 (09-22-23 @ 06:17), Max: 37.1 (09-21-23 @ 18:28)  HR: 81 (09-22-23 @ 06:17) (65 - 85)  BP: 119/75 (09-22-23 @ 06:17) (119/75 - 142/80)  RR: 18 (09-22-23 @ 06:17) (18 - 18)  SpO2: 97% (09-22-23 @ 06:17) (97% - 100%)  Wt(kg): --    I&O's Summary    General:  Alert and Oriented * 3.   Head: Normocephalic and atraumatic.   Neck: No JVD. No bruits. Supple. Does not appear to be enlarged.   Cardiovascular: + S1,S2 ; RRR Soft systolic murmur at the left lower sternal border. No rubs noted.    Lungs: CTA b/l. No rhonchi, rales or wheezes.   Abdomen: + BS, soft. Non tender. Non distended. No rebound. No guarding.   Extremities: No clubbing/cyanosis/edema.   Neurologic: Moves all four extremities. Full range of motion.   Skin: Warm and moist. The patient's skin has normal elasticity and good skin turgor.   Psychiatric: Appropriate mood and affect.  Musculoskeletal: Normal range of motion, normal strength       < from: TTE W or WO Ultrasound Enhancing Agent (09.21.23 @ 15:27) >  CONCLUSIONS:      1. Left ventricular systolic function is normal with an ejection fraction of 58 % by Daniel's method of disks.   2. There is normal left ventricular diastolic function.   3. Right ventricular cavity is normal in size and normal systolic function.   4. The leftatrium is normal in size.   5. The right atrium is normal in size.   6. Trileaflet aortic valve with normal systolic excursion.   7. Structurally normal mitral valve with normal leaflet excursion.   8. Structurally normal tricuspid valve with normal leaflet excursion.   9. The inferior vena cava is normal in size (normal <2.1cm) with normal inspiratory collapse (normal >50%) consistent with normal right atrial pressure (~3, range 0-5mmHg).  10. No pericardial effusion seen.    < end of copied text >      RADIOLOGY:         CXR:     < from: Xray Chest 2 Views PA/Lat (09.21.23 @ 05:33) >    Cardiac/mediastinum/hilum: Heart size is within normal limits.    Lung parenchyma/Pleura: The lungs are clear. There is no pleural   effusion. There is no pneumothorax.    Skeleton/soft tissues: No acute osseous abnormalities.    < end of copied text >      ASSESSMENT/PLAN:  Pt is a 47-year-old male past medical history of  hypertension, SVT  presents to ED for approximately 4 to 5 hours of central chest pain/discomfort.     1. Chest Pain  - suspect pericarditis as has positional component as well as diffuse elevations and elevated ESR/CRP  - TTE with no effusion  - c/w colchicine  0.6 mg BID feels better after it was started will hold of on NSAIDs as he feels better and has history of CKD    Flavio Don MD  Pager: [phone]     
EP Attending  HISTORY OF PRESENT ILLNESS: HPI:   47-year-old male past medical history hypertension, SVT on losartan and Cardizem presents to ED for approximately 4 to 5 hours of central chest pain/discomfort.  Patient was getting ready for bed when the symptoms started.  No exertion.  Central chest pain nonradiating.  No nausea no vomiting no diaphoresis.  Patient denies fever, chills, cough, shortness of breath, abdominal pain, nausea, vomiting.  No recent immobilization or surgeries.  Patient denies alcohol, tobacco, drug use.  No similar symptoms prior.  No recent echo or stress test. (21 Sep 2023 08:49)    Had last seen cardiology ~4-5 yrs ago.  Has Holter-monitor documented SVT that was occurring many times per week. Has done well on Diltiazem. Has not required cardioversion or ablation.    Today, here w/ acute onset chest pain overnight that has prevented sleep. Described as pleuritic and pressure, worse with lying flat and worse with deep inspiration ,but also provocable with exertion.  No cough, no viral prodrome, no fainting, no palpitations. A 10 pt ROS is otherwise negative.  Date of service 9/22- resting in bed, feels well, chest pain improved.  awaiting DC home.    PAST MEDICAL & SURGICAL HISTORY:  HTN (hypertension)  SVT (supraventricular tachycardia)  No significant past surgical history    colchicine 0.6 milliGRAM(s) Oral two times a day  diltiazem    milliGRAM(s) Oral daily  enoxaparin Injectable 40 milliGRAM(s) SubCutaneous every 24 hours  influenza   Vaccine 0.5 milliLiter(s) IntraMuscular once  losartan 100 milliGRAM(s) Oral daily                        11.8   7.20  )-----------( 248      ( 22 Sep 2023 05:47 )             33.6       09-22    139  |  105  |  23  ----------------------------<  111<H>  4.4   |  26  |  1.43<H>    Ca    8.9      22 Sep 2023 05:47  Phos  3.1     09-22  Mg     2.10     09-22    TPro  6.5  /  Alb  3.4  /  TBili  0.5  /  DBili  x   /  AST  20  /  ALT  33  /  AlkPhos  50  09-21      CARDIAC MARKERS ( 21 Sep 2023 11:35 )  x     / x     / 116 U/L / x     / x        T(C): 36.7 (09-22-23 @ 12:30), Max: 37.1 (09-21-23 @ 18:28)  HR: 76 (09-22-23 @ 12:30) (65 - 85)  BP: 147/87 (09-22-23 @ 12:30) (119/75 - 147/87)  RR: 18 (09-22-23 @ 12:30) (18 - 18)  SpO2: 100% (09-22-23 @ 12:30) (97% - 100%)  Wt(kg): --    I&O's Summary        General: Well nourished, no acute distress, alert and oriented x 3  Head: normocephalic, no trauma, prior excisional biopsies for basal cell on scalp, cheeks.  Neck: no JVD, no bruit, supple, not enlarged  CV: S1S2, no S3, regular rate, rhythm is SINUS, no murmur, audible pericardial friction rub.  Lungs: clear BL, no rales or wheezes  Abdomen: bowel sounds +, soft, nontender, nondistended  Extremities: no clubbing, cyanosis or edema  Neuro: Moves all 4 extremities, sensation intact x 4 extremities  Skin: warm and moist, normal turgor  Psych: Mood and affect are appropriate for circumstances  MSK: normal range of motion and strength x4 extremities.      TELEMETRY: NSR with concave ST-elevation	    ECG: initially normal, has developed diffuse concave KATELIN / J-point elevation.  Echo: normal biventricular function and valves. no pericardial effusion.    Biomarkers: Troponin 22 (indeterminate) but flat trend x 3 checks, in the setting of CKD.  Normal CK / MB.  Normal BNP.  Mildly elevated ESR/CRP.    ASSESSMENT/PLAN: Mr Jamison is a very pleasant 47y Male here with acute onset chest pain, described as positional, pleuritic, but also worse w/ exertion.  He has CKD and perhaps mild hypertension. He takes diltiazem for HTN and SVT-suppression.  He has not had any SVT breakthru on diltiazem over the last 4-5 years, and this is considered a remote history/inactive issue.  Pain and ECG changes most consistent with pericarditis.  No apparent 'cyndi-pericarditis' and no ventricular arrhythmias on telemetry.  Echo is reassuring.  Continue Colchicine for clinical / ECG dx of pericarditis..  Should resume care w/ Cardiology as outpatient, for routine Holter surveillance.    May need clearance to return for further Basal Cell excision from his face.  Followup w/ Dr Mancuso re: CKD.  Creat has been stable ~1.3 dating back to 2021 in [place] EHR records. Continue Losartan for HTN.    Ryan Oliver M.D.  Cardiac Electrophysiology    office [phone]  pager [phone]
[facility] NEPHROLOGY- PROGRESS NOTE    47 year old Male with history of HTN presents with chest pain. Nephrology consulted for elevated Scr.    REVIEW OF SYSTEMS:  Gen: no fevers  Cards: + chest pain improving  Resp: no dyspnea  GI: no nausea or vomiting or diarrhea  Vascular: no LE edema    No Known Allergies      Hospital Medications: Medications reviewed    VITALS:  T(F): 98.2 (09-22-23 @ 06:17), Max: 98.8 (09-21-23 @ 18:28)  HR: 81 (09-22-23 @ 06:17)  BP: 119/75 (09-22-23 @ 06:17)  RR: 18 (09-22-23 @ 06:17)  SpO2: 97% (09-22-23 @ 06:17)  Wt(kg): --  Height (cm): 172.7 (09-21 @ 09:23)  Weight (kg): 87.7 (09-21 @ 09:23)  BMI (kg/m2): 29.4 (09-21 @ 09:23)  BSA (m2): 2.01 (09-21 @ 09:23)      PHYSICAL EXAM:    Gen: NAD, calm  Cards: RRR, +S1/S2, no M/G/R  Resp: CTA B/L  GI: soft, NT/ND, NABS  Vascular: no LE edema B/L    LABS:  09-22    139  |  105  |  23  ----------------------------<  111<H>  4.4   |  26  |  1.43<H>    Ca    8.9      22 Sep 2023 05:47  Phos  3.1     09-22  Mg     2.10     09-22    TPro  6.5  /  Alb  3.4  /  TBili  0.5  /  DBili      /  AST  20  /  ALT  33  /  AlkPhos  50  09-21    Creatinine Trend: 1.43 <--, 1.34 <--, 1.43 <--                        11.8   7.20  )-----------( 248      ( 22 Sep 2023 05:47 )             33.6     Urine Studies:  Urinalysis Basic - ( 22 Sep 2023 05:47 )    Color:  / Appearance:  / SG:  / pH:   Gluc: 111 mg/dL / Ketone:   / Bili:  / Urobili:    Blood:  / Protein:  / Nitrite:    Leuk Esterase:  / RBC:  / WBC    Sq Epi:  / Non Sq Epi:  / Bacteria:       Creatinine, Random Urine: 42 mg/dL (09-21 @ 16:35)  Protein/Creatinine Ratio Calculation: 1.0 Ratio (09-21 @ 16:35)      RADIOLOGY & ADDITIONAL STUDIES:    < from: US Kidney and Bladder (09.21.23 @ 17:51) >  IMPRESSION:  No renal mass, hydronephrosis or calculus is visualized sonographically        --- End of Report ---    < end of copied text >  
Patient is a 47y old  Male who presents with a chief complaint of chest pain (22 Sep 2023 14:19)    Date of servie : 09-22-23 @ 15:53  INTERVAL HPI/OVERNIGHT EVENTS:  T(C): 36.7 (09-22-23 @ 12:30), Max: 37.1 (09-21-23 @ 18:28)  HR: 76 (09-22-23 @ 12:30) (65 - 85)  BP: 147/87 (09-22-23 @ 12:30) (119/75 - 147/87)  RR: 18 (09-22-23 @ 12:30) (18 - 18)  SpO2: 100% (09-22-23 @ 12:30) (97% - 100%)  Wt(kg): --  I&O's Summary      LABS:                        11.8   7.20  )-----------( 248      ( 22 Sep 2023 05:47 )             33.6     09-22    139  |  105  |  23  ----------------------------<  111<H>  4.4   |  26  |  1.43<H>    Ca    8.9      22 Sep 2023 05:47  Phos  3.1     09-22  Mg     2.10     09-22    TPro  6.5  /  Alb  3.4  /  TBili  0.5  /  DBili  x   /  AST  20  /  ALT  33  /  AlkPhos  50  09-21    PT/INR - ( 21 Sep 2023 06:08 )   PT: 10.9 sec;   INR: 0.96 ratio         PTT - ( 21 Sep 2023 06:08 )  PTT:21.4 sec  Urinalysis Basic - ( 22 Sep 2023 05:47 )    Color: x / Appearance: x / SG: x / pH: x  Gluc: 111 mg/dL / Ketone: x  / Bili: x / Urobili: x   Blood: x / Protein: x / Nitrite: x   Leuk Esterase: x / RBC: x / WBC x   Sq Epi: x / Non Sq Epi: x / Bacteria: x      CAPILLARY BLOOD GLUCOSE            Urinalysis Basic - ( 22 Sep 2023 05:47 )    Color: x / Appearance: x / SG: x / pH: x  Gluc: 111 mg/dL / Ketone: x  / Bili: x / Urobili: x   Blood: x / Protein: x / Nitrite: x   Leuk Esterase: x / RBC: x / WBC x   Sq Epi: x / Non Sq Epi: x / Bacteria: x        MEDICATIONS  (STANDING):  colchicine 0.6 milliGRAM(s) Oral two times a day  diltiazem    milliGRAM(s) Oral daily  enoxaparin Injectable 40 milliGRAM(s) SubCutaneous every 24 hours  influenza   Vaccine 0.5 milliLiter(s) IntraMuscular once  losartan 100 milliGRAM(s) Oral daily    MEDICATIONS  (PRN):          PHYSICAL EXAM:  GENERAL: NAD, well-groomed, well-developed  HEAD:  Atraumatic, Normocephalic  CHEST/LUNG: Clear to percussion bilaterally; No rales, rhonchi, wheezing, or rubs  HEART: Regular rate and rhythm; No murmurs, rubs, or gallops  ABDOMEN: Soft, Nontender, Nondistended; Bowel sounds present  EXTREMITIES:  2+ Peripheral Pulses, No clubbing, cyanosis, or edema  LYMPH: No lymphadenopathy noted  SKIN: No rashes or lesions    Care Discussed with Consultants/Other Providers [ ] YES  [ ] NO

## 2023-09-22 NOTE — DISCHARGE NOTE PROVIDER - NSDCCPTREATMENT_GEN_ALL_CORE_FT
PRINCIPAL PROCEDURE  Procedure: 2D echo  Findings and Treatment: 1. Left ventricular systolic function is normal with an ejection fraction of 58 % by Daniel's method of disks.   2. There is normal left ventricular diastolic function.   3. Right ventricular cavity is normal in size and normal systolic function.   4. The leftatrium is normal in size.   5. The right atrium is normal in size.   6. Trileaflet aortic valve with normal systolic excursion.   7. Structurally normal mitral valve with normal leaflet excursion.   8. Structurally normal tricuspid valve with normal leaflet excursion.   9. The inferior vena cava is normal in size (normal <2.1cm) with normal inspiratory collapse (normal >50%) consistent with normal right atrial pressure (~3, range 0-5mmHg).  10. No pericardial effusion seen.

## 2023-09-22 NOTE — PHARMACOTHERAPY INTERVENTION NOTE - COMMENTS
Discharge medications were reviewed with the patient. Current medication schedule was discussed in detail including: medication name, indication, dose, administration times, side effects and special instructions. All questions and concerns were answered and addressed. Patient verbalized understanding and was provided with educational handout.    Emily Amin, PharmD  Clinical Pharmacy Specialist  [facility] [id number]

## 2023-09-22 NOTE — DISCHARGE NOTE PROVIDER - NSDCCPCAREPLAN_GEN_ALL_CORE_FT
PRINCIPAL DISCHARGE DIAGNOSIS  Diagnosis: Pericarditis  Assessment and Plan of Treatment: continue colchicine as directed and followup with cardiologist Dr Bruno on 10/3 1:30PM      SECONDARY DISCHARGE DIAGNOSES  Diagnosis: SVT (supraventricular tachycardia)  Assessment and Plan of Treatment: history of SVT continue cardizem as directed    Diagnosis: Proteinuria  Assessment and Plan of Treatment: you have protein and blood in urine, followup with Dr Mancuso for continue workup

## 2023-09-22 NOTE — DISCHARGE NOTE PROVIDER - HOSPITAL COURSE
47-year-old male past medical history hypertension, SVT on losartan and Cardizem presents to ED for approximately 4 to 5 hours of central chest pain/discomfort.    Hospital course:    Chest pain possible Pericarditis   ESR/CRP elevated  EKG with diffuse ST elevations  TTE: EF 58 unremarkable   cards: suspect pericarditis as has positional component as well as diffuse elevations and elevated ESR/CRP  colchicine  0.6 mg BID (likely needs 3-6 months treatment)  pt noted improvement with colchicine, Followup with cardiology Dr Bruno on 10/3 1:30PM    Hx of SVT  diltiazem  EP: not had any SVT breakthru on diltiazem over the last 4-5 years, and this is considered a remote history/inactive issue.    HTN  losartan    CKD  Renal US: No renal mass, hydronephrosis or calculus  nephro: Avoid NSAID, proteinuria/hematuria outpatient followup for further testing     Case discussed with Dr Don on 9/22 pt medically optimized for discharge

## 2023-09-22 NOTE — DISCHARGE NOTE PROVIDER - PROVIDER TOKENS
FREE:[LAST:[Dr Bruno],PHONE:[(   )    -],FAX:[(   )    -],ADDRESS:[10/3 1:30PM]],PROVIDER:[TOKEN:[[id number]]]

## 2023-09-22 NOTE — DISCHARGE NOTE PROVIDER - NSDCMRMEDTOKEN_GEN_ALL_CORE_FT
colchicine 0.6 mg oral tablet: 1 tab(s) orally 2 times a day  dilTIAZem 180 mg/24 hours oral tablet, extended release: 1 tab(s) orally once a day  losartan 100 mg oral tablet: 1 tab(s) orally once a day

## 2023-09-22 NOTE — PROGRESS NOTE ADULT - ASSESSMENT
47 year old Male with history of HTN presents with chest pain. Nephrology consulted for elevated Scr.    1) CKD-2: Scr stable as compared to labs in 2021. UA with mild microscopic hematuria and subnephrotic range proteinuria for which patient advised outpatient follow up for serologies, genetic testing and possible renal biopsy if work up negative (underlying IgAN?). Renal US unremarkable. Avoid nephrotoxins. Monitor electrolytes.    2) HTN with CKD: BP controlled. Continue with current medications. Monitor BP.    3) Hyperkalemia: Due to hemolyzed specimen. Repeat potassium acceptable. Monitor serum potassium.    4) Chest pain: Due to pericarditis. On colchicine 0.6 mg twice daily. Avoiding NSAIDS for now.    No renal objection to discharge with outpatient follow up.      [facility] NEPHROLOGY  Nick Tao M.D.  Duncan Mancuso D.O.  Radha Cabral M.D.  MD Penny Lopes, MSN, ANP-C    Telephone: [phone]  Facsimile: [phone] [place] [address], [address]  [place] [address]  
 47-year-old male past medical history hypertension, SVT on losartan and Cardizem presents to ED for approximately 4 to 5 hours of central chest pain/discomfort.  Patient was getting ready for bed when the symptoms started.  No exertion.  Central chest pain nonradiating.  No nausea no vomiting no diaphoresis.  Patient denies fever, chills, cough, shortness of breath, abdominal pain, nausea, vomiting.  No recent immobilization or surgeries.  Patient denies alcohol, tobacco, drug use.  No similar symptoms prior.  No recent echo or stress test.    1 Chest pain  - telemonitor  - ro ACS  - cards fu   - ischemia more as per cards    2 Hx of SVT  - EP fu  - cw diltiazem  - telemonitor    3 HTN  - cw losartan  - DASH diet     4 Lovenox for DVT prophylaxis

## 2023-09-22 NOTE — DISCHARGE NOTE PROVIDER - CARE PROVIDER_API CALL
Dr Bruno,   10/3 1:30PM  Phone: (   )    -  Fax: (   )    -  Follow Up Time:     Duncan Mancuso  Nephrology  [address], [address] [place] [address]  Phone: [phone]  Fax: [phone]  Follow Up Time:

## 2023-09-22 NOTE — DISCHARGE NOTE PROVIDER - NS AS DC PROVIDER CONTACT Y/N MULTI
Patient states she will go to urgent care.  Cannot come until later tomorrow and no appt.s available.    Yes

## 2023-09-22 NOTE — DISCHARGE NOTE NURSING/CASE MANAGEMENT/SOCIAL WORK - PATIENT PORTAL LINK FT
You can access the FollowMyHealth Patient Portal offered by [facility] by registering at the following website: [email or web address]. By joining [employer]’s FollowMyHealth portal, you will also be able to view your health information using other applications (apps) compatible with our system.

## 2023-10-10 ENCOUNTER — TRANSCRIPTION ENCOUNTER (OUTPATIENT)
Age: 48
End: 2023-10-10

## 2024-10-08 NOTE — DISCHARGE NOTE NURSING/CASE MANAGEMENT/SOCIAL WORK - NSDCVIVACCINE_GEN_ALL_CORE_FT
Caller: JU CHACKO    Relationship to patient: Emergency Contact    Best call back number: [phone] AND WORK #- [phone]    No Vaccines Administered.

## 2025-03-07 PROBLEM — Z00.00 ENCOUNTER FOR PREVENTIVE HEALTH EXAMINATION: Status: ACTIVE | Noted: 2025-03-07

## 2025-03-14 ENCOUNTER — OUTPATIENT (OUTPATIENT)
Dept: OUTPATIENT SERVICES | Facility: HOSPITAL | Age: 50
LOS: 1 days | End: 2025-03-14
Payer: COMMERCIAL

## 2025-03-14 ENCOUNTER — APPOINTMENT (OUTPATIENT)
Dept: CT IMAGING | Facility: IMAGING CENTER | Age: 50
End: 2025-03-14

## 2025-03-14 DIAGNOSIS — E78.00 PURE HYPERCHOLESTEROLEMIA, UNSPECIFIED: ICD-10-CM

## 2025-03-14 PROCEDURE — 75571 CT HRT W/O DYE W/CA TEST: CPT

## 2025-03-14 PROCEDURE — 75571 CT HRT W/O DYE W/CA TEST: CPT | Mod: 26

## 2025-06-19 ENCOUNTER — OUTPATIENT (OUTPATIENT)
Dept: OUTPATIENT SERVICES | Facility: HOSPITAL | Age: 50
LOS: 1 days | End: 2025-06-19
Payer: COMMERCIAL

## 2025-06-19 ENCOUNTER — APPOINTMENT (OUTPATIENT)
Dept: ULTRASOUND IMAGING | Facility: HOSPITAL | Age: 50
End: 2025-06-19

## 2025-06-19 ENCOUNTER — RESULT REVIEW (OUTPATIENT)
Age: 50
End: 2025-06-19

## 2025-06-19 VITALS
TEMPERATURE: 98 F | RESPIRATION RATE: 20 BRPM | OXYGEN SATURATION: 99 % | SYSTOLIC BLOOD PRESSURE: 156 MMHG | DIASTOLIC BLOOD PRESSURE: 62 MMHG | HEART RATE: 76 BPM

## 2025-06-19 DIAGNOSIS — R80.1 PERSISTENT PROTEINURIA, UNSPECIFIED: ICD-10-CM

## 2025-06-19 PROCEDURE — 88346 IMFLUOR 1ST 1ANTB STAIN PX: CPT

## 2025-06-19 PROCEDURE — 88348 ELECTRON MICROSCOPY DX: CPT

## 2025-06-19 PROCEDURE — 50200 RENAL BIOPSY PERQ: CPT | Mod: LT

## 2025-06-19 PROCEDURE — 88350 IMFLUOR EA ADDL 1ANTB STN PX: CPT | Mod: 26

## 2025-06-19 PROCEDURE — 88348 ELECTRON MICROSCOPY DX: CPT | Mod: 26

## 2025-06-19 PROCEDURE — 50200 RENAL BIOPSY PERQ: CPT

## 2025-06-19 PROCEDURE — 88305 TISSUE EXAM BY PATHOLOGIST: CPT

## 2025-06-19 PROCEDURE — 88346 IMFLUOR 1ST 1ANTB STAIN PX: CPT | Mod: 26

## 2025-06-19 PROCEDURE — 88350 IMFLUOR EA ADDL 1ANTB STN PX: CPT

## 2025-06-19 PROCEDURE — 76942 ECHO GUIDE FOR BIOPSY: CPT | Mod: 26

## 2025-06-19 PROCEDURE — 88313 SPECIAL STAINS GROUP 2: CPT

## 2025-06-19 PROCEDURE — 88313 SPECIAL STAINS GROUP 2: CPT | Mod: 26

## 2025-06-19 PROCEDURE — 76942 ECHO GUIDE FOR BIOPSY: CPT

## 2025-06-19 PROCEDURE — 88305 TISSUE EXAM BY PATHOLOGIST: CPT | Mod: 26

## 2025-06-19 RX ORDER — ACETAMINOPHEN 500 MG/5ML
650 LIQUID (ML) ORAL EVERY 6 HOURS
Refills: 0 | Status: ACTIVE | OUTPATIENT
Start: 2025-06-19 | End: 2026-05-18

## 2025-06-19 NOTE — ASU PATIENT PROFILE, ADULT - FALL HARM RISK - UNIVERSAL INTERVENTIONS
Bed in lowest position, wheels locked, appropriate side rails in place/Call bell, personal items and telephone in reach/Instruct patient to call for assistance before getting out of bed or chair/Non-slip footwear when patient is out of bed/[place] to call system/Physically safe environment - no spills, clutter or unnecessary equipment/Purposeful Proactive Rounding/Room/bathroom lighting operational, light cord in reach

## 2025-06-23 LAB — SURGICAL PATHOLOGY STUDY: SIGNIFICANT CHANGE UP
